# Patient Record
Sex: FEMALE | Race: WHITE | Employment: FULL TIME | URBAN - METROPOLITAN AREA
[De-identification: names, ages, dates, MRNs, and addresses within clinical notes are randomized per-mention and may not be internally consistent; named-entity substitution may affect disease eponyms.]

---

## 2019-08-01 ENCOUNTER — HOSPITAL ENCOUNTER (OUTPATIENT)
Age: 34
Discharge: HOME | End: 2019-08-01
Attending: EMERGENCY MEDICINE
Payer: COMMERCIAL

## 2019-08-01 VITALS
RESPIRATION RATE: 18 BRPM | HEART RATE: 76 BPM | BODY MASS INDEX: 23.32 KG/M2 | TEMPERATURE: 98 F | SYSTOLIC BLOOD PRESSURE: 142 MMHG | HEIGHT: 65 IN | DIASTOLIC BLOOD PRESSURE: 107 MMHG | OXYGEN SATURATION: 100 % | WEIGHT: 140 LBS

## 2019-08-01 DIAGNOSIS — N30.01 ACUTE CYSTITIS WITH HEMATURIA: Primary | ICD-10-CM

## 2019-08-01 LAB
B-HCG UR QL: NEGATIVE
BILIRUB UR QL STRIP: NEGATIVE
COLOR UR: YELLOW
GLUCOSE UR STRIP-MCNC: NEGATIVE MG/DL
KETONES UR STRIP-MCNC: NEGATIVE MG/DL
NITRITE UR QL STRIP: NEGATIVE
PH UR STRIP: 7 [PH]
PROT UR STRIP-MCNC: 30 MG/DL
SP GR UR STRIP: 1.01
UROBILINOGEN UR STRIP-ACNC: <2 MG/DL

## 2019-08-01 PROCEDURE — 81025 URINE PREGNANCY TEST: CPT

## 2019-08-01 PROCEDURE — 81002 URINALYSIS NONAUTO W/O SCOPE: CPT

## 2019-08-01 PROCEDURE — 99204 OFFICE O/P NEW MOD 45 MIN: CPT

## 2019-08-01 PROCEDURE — 87086 URINE CULTURE/COLONY COUNT: CPT | Performed by: EMERGENCY MEDICINE

## 2019-08-01 RX ORDER — CEPHALEXIN 500 MG/1
500 CAPSULE ORAL 2 TIMES DAILY
Qty: 14 CAPSULE | Refills: 0 | Status: SHIPPED | OUTPATIENT
Start: 2019-08-01 | End: 2019-08-08

## 2019-08-01 NOTE — ED PROVIDER NOTES
Patient Seen in: [address]    History   Stated Complaint: urinary problem    HPI    This patient complains of pain and burning and frequency of urination for the last 2 days.   The patient denies fever chills vomiting di BP (!) 172/113   Pulse 76   Resp 18   Temp 97.9 °F (36.6 °C)   Temp src Oral   SpO2 100 %   O2 Device None (Room air)       Current:BP (!) 142/107   Pulse 76   Temp 97.9 °F (36.6 °C) (Oral)   Resp 18   Ht 165.1 cm (5' 5\")   Wt 63.5 kg   LMP 07/25/2019 (

## 2019-08-01 NOTE — ED NOTES
Patient instructed on elevated blood pressure and the importance of monitoring it and recording the results. She states she is doing that and will report the results to her doctor. Patient discharged to home.   All discharged instructions gone over with johnathan